# Patient Record
Sex: MALE | Race: OTHER | Employment: OTHER | URBAN - METROPOLITAN AREA
[De-identification: names, ages, dates, MRNs, and addresses within clinical notes are randomized per-mention and may not be internally consistent; named-entity substitution may affect disease eponyms.]

---

## 2019-01-29 NOTE — PATIENT DISCUSSION
CATARACT DISCUSSION: I have explained to the patient that the cataract is affecting the quality of vision and that cataract surgery may eventually be required.

## 2019-01-29 NOTE — PATIENT DISCUSSION
PATIENT DEFERS REFRACTION ON TODAY'S EXAMINATION. DID NOT BRING IN SPECTACLES FOR VISION TESTING TODAY.

## 2022-04-19 ENCOUNTER — CONSULTATION/EVALUATION (OUTPATIENT)
Dept: URBAN - METROPOLITAN AREA CLINIC 39 | Facility: CLINIC | Age: 72
End: 2022-04-19

## 2022-04-19 DIAGNOSIS — H25.812: ICD-10-CM

## 2022-04-19 DIAGNOSIS — H40.1112: ICD-10-CM

## 2022-04-19 DIAGNOSIS — Z98.890: ICD-10-CM

## 2022-04-19 PROCEDURE — 92134 CPTRZ OPH DX IMG PST SGM RTA: CPT

## 2022-04-19 PROCEDURE — 92020 GONIOSCOPY: CPT

## 2022-04-19 PROCEDURE — 92014 COMPRE OPH EXAM EST PT 1/>: CPT

## 2022-04-19 PROCEDURE — 92025-3 CORNEAL TOPO, REFUSED

## 2022-04-19 PROCEDURE — 76514 ECHO EXAM OF EYE THICKNESS: CPT

## 2022-04-19 PROCEDURE — 92136TC INTERFEROMETRY - TECHNICAL COMPONENT

## 2022-04-19 RX ORDER — MOXIFLOXACIN OPHTHALMIC 5 MG/ML: 1 SOLUTION/ DROPS OPHTHALMIC

## 2022-04-19 RX ORDER — PREDNISOLONE ACETATE 10 MG/ML: 1 SUSPENSION/ DROPS OPHTHALMIC

## 2022-04-19 ASSESSMENT — VISUAL ACUITY
OD_PH: 20/40-1
OS_CC: 20/60-2
OS_SC: J2
OS_PH: 20/50-1
OS_SC: 20/100+1
OD_CC: <J12
OD_SC: J3
OS_BAT: 20/400
OD_SC: 20/100-1
OD_BAT: 20/200
OD_CC: 20/50-1
OS_CC: J6+

## 2022-04-19 ASSESSMENT — TONOMETRY
OD_IOP_MMHG: 24
OS_IOP_MMHG: 19

## 2022-04-19 ASSESSMENT — PACHYMETRY
OS_CT_UM: 605
OD_CT_UM: 600

## 2022-04-19 NOTE — PATIENT DISCUSSION
Recommend MIGS surgery at the time of cataract surgery for better control of IOP with less fluctuation.

## 2022-06-20 ENCOUNTER — PRE-OP/H&P (OUTPATIENT)
Dept: URBAN - METROPOLITAN AREA CLINIC 39 | Facility: CLINIC | Age: 72
End: 2022-06-20

## 2022-06-20 ENCOUNTER — SURGERY/PROCEDURE (OUTPATIENT)
Dept: URBAN - METROPOLITAN AREA CLINIC 39 | Facility: CLINIC | Age: 72
End: 2022-06-20

## 2022-06-20 DIAGNOSIS — H40.1121: ICD-10-CM

## 2022-06-20 DIAGNOSIS — H25.812: ICD-10-CM

## 2022-06-20 DIAGNOSIS — H40.1112: ICD-10-CM

## 2022-06-20 PROCEDURE — 66174 TRLUML DIL AQ O/F CAN W/O ST: CPT

## 2022-06-20 PROCEDURE — 99211HP H&P OFFICE/OUTPATIENT VISIT, EST

## 2022-06-20 PROCEDURE — 66991 XCAPSL CTRC RMVL INSJ 1+: CPT

## 2022-06-21 ENCOUNTER — POST-OP (OUTPATIENT)
Dept: URBAN - METROPOLITAN AREA CLINIC 35 | Facility: CLINIC | Age: 72
End: 2022-06-21

## 2022-06-21 DIAGNOSIS — Z96.1: ICD-10-CM

## 2022-06-21 PROCEDURE — 99024 POSTOP FOLLOW-UP VISIT: CPT

## 2022-06-21 ASSESSMENT — VISUAL ACUITY
OS_SC: 20/200
OS_SC: J10-
OD_SC: 20/200
OD_SC: J10
OU_SC: 20/100-1
OU_SC: J8

## 2022-06-21 ASSESSMENT — TONOMETRY
OS_IOP_MMHG: 22
OD_IOP_MMHG: 18

## 2022-07-15 ENCOUNTER — POST-OP (OUTPATIENT)
Dept: URBAN - METROPOLITAN AREA CLINIC 35 | Facility: CLINIC | Age: 72
End: 2022-07-15

## 2022-07-15 DIAGNOSIS — Z96.1: ICD-10-CM

## 2022-07-15 PROCEDURE — 99024 POSTOP FOLLOW-UP VISIT: CPT

## 2022-07-15 ASSESSMENT — VISUAL ACUITY
OS_PH: 20/70-1
OS_CC: J8-
OD_CC: J10
OS_CC: 20/200+1
OD_SC: 20/200
OD_CC: 20/40-2

## 2022-07-15 ASSESSMENT — TONOMETRY
OD_IOP_MMHG: 19
OS_IOP_MMHG: 20

## 2022-08-23 ENCOUNTER — FOLLOW UP (OUTPATIENT)
Dept: URBAN - METROPOLITAN AREA CLINIC 35 | Facility: CLINIC | Age: 72
End: 2022-08-23

## 2022-08-23 DIAGNOSIS — Z96.1: ICD-10-CM

## 2022-08-23 DIAGNOSIS — H35.371: ICD-10-CM

## 2022-08-23 PROCEDURE — 92134 CPTRZ OPH DX IMG PST SGM RTA: CPT

## 2022-08-23 PROCEDURE — 92012 INTRM OPH EXAM EST PATIENT: CPT

## 2022-08-23 ASSESSMENT — VISUAL ACUITY
OU_CC: 20/40+2
OD_CC: 20/30-1
OS_CC: 20/50+2

## 2022-08-23 ASSESSMENT — TONOMETRY
OS_IOP_MMHG: 19
OD_IOP_MMHG: 18